# Patient Record
Sex: MALE | NOT HISPANIC OR LATINO | Employment: FULL TIME | URBAN - METROPOLITAN AREA
[De-identification: names, ages, dates, MRNs, and addresses within clinical notes are randomized per-mention and may not be internally consistent; named-entity substitution may affect disease eponyms.]

---

## 2021-02-12 ENCOUNTER — OFFICE VISIT (OUTPATIENT)
Dept: ENDOCRINOLOGY | Facility: CLINIC | Age: 56
End: 2021-02-12

## 2021-02-12 ENCOUNTER — LAB (OUTPATIENT)
Dept: LAB | Facility: HOSPITAL | Age: 56
End: 2021-02-12

## 2021-02-12 VITALS
TEMPERATURE: 97.8 F | DIASTOLIC BLOOD PRESSURE: 60 MMHG | HEIGHT: 67 IN | WEIGHT: 159 LBS | OXYGEN SATURATION: 98 % | SYSTOLIC BLOOD PRESSURE: 124 MMHG | HEART RATE: 88 BPM | BODY MASS INDEX: 24.96 KG/M2

## 2021-02-12 DIAGNOSIS — C73 PAPILLARY THYROID CARCINOMA (HCC): Primary | ICD-10-CM

## 2021-02-12 DIAGNOSIS — E89.0 POSTOPERATIVE HYPOTHYROIDISM: ICD-10-CM

## 2021-02-12 LAB
T4 FREE SERPL-MCNC: 1.5 NG/DL (ref 0.93–1.7)
TSH SERPL DL<=0.05 MIU/L-ACNC: 2.32 UIU/ML (ref 0.27–4.2)

## 2021-02-12 PROCEDURE — 99204 OFFICE O/P NEW MOD 45 MIN: CPT | Performed by: INTERNAL MEDICINE

## 2021-02-12 PROCEDURE — 84439 ASSAY OF FREE THYROXINE: CPT

## 2021-02-12 PROCEDURE — 84443 ASSAY THYROID STIM HORMONE: CPT

## 2021-02-12 RX ORDER — LEVOTHYROXINE SODIUM 88 UG/1
TABLET ORAL DAILY
COMMUNITY
Start: 2020-11-20 | End: 2021-02-12 | Stop reason: SDUPTHER

## 2021-02-12 RX ORDER — LEVOTHYROXINE SODIUM 88 UG/1
88 TABLET ORAL DAILY
Qty: 90 TABLET | Refills: 3 | Status: SHIPPED | OUTPATIENT
Start: 2021-02-12 | End: 2022-02-10

## 2021-02-12 NOTE — PROGRESS NOTES
"     Office Note      Date: 2021  Patient Name: Dangelo Jha  MRN: [id number]  : 1965    Chief Complaint   Patient presents with   • Thyroid Problem       History of Present Illness:   Dangelo Jha is a 55 y.o. male who presents for Thyroid Problem    He remains on T4 88mcg qd. He is taking this correctly. He isn't taking any interfering meds concurrently. He hasn't noted any change in the size of his neck. He denies any compressive sxs. He denies any sxs of hypo- or hyperthyroidism at this time.    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 residual thyroid tissue in right thyroid bed - TG 1.0; neck u/s  stable with unstimulted TG of 1.1; neck u/s 2018 was stable and unstimulated TG was 1.1; neck u/s 2019 with stable neck mass and TG 3.0; unstimulated TG 1.1 2020    Subjective      Review of Systems:   Review of Systems   Constitutional: Negative.    Cardiovascular: Negative.    Gastrointestinal: Negative.    Endocrine: Negative.        The following portions of the patient's history were reviewed and updated as appropriate: allergies, current medications, past family history, past medical history, past social history, past surgical history and problem list.    Objective     Visit Vitals  /60 (BP Location: Left arm, Patient Position: Sitting, Cuff Size: Adult)   Pulse 88   Temp 97.8 °F (36.6 °C) (Infrared)   Ht 170.2 cm (67\")   Wt 72.1 kg (159 lb)   SpO2 98%   BMI 24.90 kg/m²       Physical Exam:  Physical Exam  Constitutional:       Appearance: Normal appearance.   Neck:      Comments: No palpable thyroid tissue or neck masses  Lymphadenopathy:      Cervical: No cervical adenopathy.   Neurological:      Mental Status: He is alert.         Labs:    TSH  No results found for: TSHBASE     Free T4  No results found for: FREET4    T3  No results found for: N8ZYHFM      TPO  No " results found for: THYROIDAB    TG AB  No results found for: THGAB    TG  No results found for: THYROGLB    CBC w/DIFF  No results found for: WBC, RBC, HGB, HCT, MCV, MCH, MCHC, RDW, RDWSD, MPV, PLT, NEUTRORELPCT, LYMPHORELPCT, MONORELPCT, EOSRELPCT, BASORELPCT, AUTOIGPER, NEUTROABS, LYMPHSABS, MONOSABS, EOSABS, BASOSABS, AUTOIGNUM, NRBC        Assessment / Plan      Assessment & Plan:  Diagnoses and all orders for this visit:    1. Papillary thyroid carcinoma (CMS/HCC) (Primary)  Assessment & Plan:  Plan for neck u/s and TG in 6 months.      2. Postoperative hypothyroidism  Assessment & Plan:  Check TFTs today.    Orders:  -     TSH; Future  -     T4, Free; Future    Other orders  -     levothyroxine (SYNTHROID, LEVOTHROID) 88 MCG tablet; Take 1 tablet by mouth Daily.  Dispense: 90 tablet; Refill: 3      Return in about 6 months (around 8/12/2021) for Recheck with TSH, free T4, TG, TG ab and neck u/s.    Bob Flynn MD   02/12/2021

## 2021-08-13 ENCOUNTER — OFFICE VISIT (OUTPATIENT)
Dept: ENDOCRINOLOGY | Facility: CLINIC | Age: 56
End: 2021-08-13

## 2021-08-13 ENCOUNTER — LAB (OUTPATIENT)
Dept: LAB | Facility: HOSPITAL | Age: 56
End: 2021-08-13

## 2021-08-13 VITALS
WEIGHT: 165 LBS | SYSTOLIC BLOOD PRESSURE: 116 MMHG | DIASTOLIC BLOOD PRESSURE: 64 MMHG | BODY MASS INDEX: 25.9 KG/M2 | HEART RATE: 68 BPM | OXYGEN SATURATION: 97 % | HEIGHT: 67 IN

## 2021-08-13 DIAGNOSIS — C73 PAPILLARY THYROID CARCINOMA (HCC): Primary | ICD-10-CM

## 2021-08-13 DIAGNOSIS — C73 PAPILLARY THYROID CARCINOMA (HCC): ICD-10-CM

## 2021-08-13 DIAGNOSIS — E89.0 POSTOPERATIVE HYPOTHYROIDISM: ICD-10-CM

## 2021-08-13 LAB
T4 FREE SERPL-MCNC: 1.59 NG/DL (ref 0.93–1.7)
TSH SERPL DL<=0.05 MIU/L-ACNC: 1.11 UIU/ML (ref 0.27–4.2)

## 2021-08-13 PROCEDURE — 99213 OFFICE O/P EST LOW 20 MIN: CPT | Performed by: INTERNAL MEDICINE

## 2021-08-13 PROCEDURE — 84432 ASSAY OF THYROGLOBULIN: CPT

## 2021-08-13 PROCEDURE — 84443 ASSAY THYROID STIM HORMONE: CPT

## 2021-08-13 PROCEDURE — 76536 US EXAM OF HEAD AND NECK: CPT | Performed by: INTERNAL MEDICINE

## 2021-08-13 PROCEDURE — 86800 THYROGLOBULIN ANTIBODY: CPT

## 2021-08-13 PROCEDURE — 84439 ASSAY OF FREE THYROXINE: CPT

## 2021-08-13 NOTE — ASSESSMENT & PLAN NOTE
Check TG and TG ab.    A neck u/s was performed today.  This revealed residual thyroid tissue in the right thyroid bed that was 1cm in size.  No abnormal lymph nodes were seen.

## 2021-08-13 NOTE — PROGRESS NOTES
"     Office Note      Date: 2021  Patient Name: Dangelo Jha  MRN: [id number]  : 1965    Chief Complaint   Patient presents with   • Hypothyroidism       History of Present Illness:   Dangelo Jha is a 56 y.o. male who presents for Hypothyroidism    He remains on T4 88mcg qd. He is taking this correctly. He isn't taking any interfering meds concurrently. He hasn't noted any change in the size of his neck. He denies any compressive sxs. He denies any sxs of hypo- or hyperthyroidism at this time.     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 residual thyroid tissue in right thyroid bed - TG 1.0; neck u/s  stable with unstimulted TG of 1.1; neck u/s 2018 was stable and unstimulated TG was 1.1; neck u/s 2019 with stable neck mass and TG 3.0; unstimulated TG 1.1 2020; unstimulated TG 1.5 and neck u/s showed 1cm residual thyroid tissue in right thyroid bed 2021.    Subjective      Review of Systems:   Review of Systems   Constitutional: Negative.    Cardiovascular: Negative.    Gastrointestinal: Negative.    Endocrine: Negative.        The following portions of the patient's history were reviewed and updated as appropriate: allergies, current medications, past family history, past medical history, past social history, past surgical history and problem list.    Objective     Visit Vitals  /64   Pulse 68   Ht 170.2 cm (67\")   Wt 74.8 kg (165 lb)   SpO2 97%   BMI 25.84 kg/m²       Physical Exam:  Physical Exam  Constitutional:       Appearance: Normal appearance.   Neurological:      Mental Status: He is alert.         Labs:    TSH  No results found for: TSHBASE     Free T4  Free T4   Date Value Ref Range Status   2021 1.59 0.93 - 1.70 ng/dL Final       T3  No results found for: C8AJIIO      TPO  No results found for: THYROIDAB    TG AB  No results found for: THGAB    TG  No " results found for: THYROGLB    CBC w/DIFF  No results found for: WBC, RBC, HGB, HCT, MCV, MCH, MCHC, RDW, RDWSD, MPV, PLT, NEUTRORELPCT, LYMPHORELPCT, MONORELPCT, EOSRELPCT, BASORELPCT, AUTOIGPER, NEUTROABS, LYMPHSABS, MONOSABS, EOSABS, BASOSABS, AUTOIGNUM, NRBC        Assessment / Plan      Assessment & Plan:  Diagnoses and all orders for this visit:    1. Papillary thyroid carcinoma (CMS/HCC) (Primary)  Assessment & Plan:  Check TG and TG ab.    A neck u/s was performed today.  This revealed residual thyroid tissue in the right thyroid bed that was 1cm in size.  No abnormal lymph nodes were seen.    Orders:  -     Thyroglobulin + Thyroglobulin Antibody ([place]); Future  -     US Thyroid    2. Postoperative hypothyroidism  Assessment & Plan:  Continue T4.  Check TSH today.    Orders:  -     TSH; Future  -     T4, Free; Future      Return in about 6 months (around 2/13/2022) for Recheck with TSH, free T4.    Bob Flynn MD   08/13/2021

## 2021-08-18 LAB — REF LAB TEST METHOD: NORMAL

## 2022-02-10 RX ORDER — LEVOTHYROXINE SODIUM 88 UG/1
88 TABLET ORAL DAILY
Qty: 90 TABLET | Refills: 0 | Status: SHIPPED | OUTPATIENT
Start: 2022-02-10 | End: 2022-02-18 | Stop reason: SDUPTHER

## 2022-02-18 ENCOUNTER — OFFICE VISIT (OUTPATIENT)
Dept: ENDOCRINOLOGY | Facility: CLINIC | Age: 57
End: 2022-02-18

## 2022-02-18 ENCOUNTER — LAB (OUTPATIENT)
Dept: LAB | Facility: HOSPITAL | Age: 57
End: 2022-02-18

## 2022-02-18 VITALS
HEIGHT: 67 IN | BODY MASS INDEX: 27.78 KG/M2 | HEART RATE: 63 BPM | DIASTOLIC BLOOD PRESSURE: 68 MMHG | OXYGEN SATURATION: 98 % | WEIGHT: 177 LBS | SYSTOLIC BLOOD PRESSURE: 114 MMHG

## 2022-02-18 DIAGNOSIS — E89.0 POSTOPERATIVE HYPOTHYROIDISM: ICD-10-CM

## 2022-02-18 DIAGNOSIS — E89.0 POSTOPERATIVE HYPOTHYROIDISM: Primary | ICD-10-CM

## 2022-02-18 DIAGNOSIS — C73 PAPILLARY THYROID CARCINOMA: ICD-10-CM

## 2022-02-18 PROCEDURE — 84439 ASSAY OF FREE THYROXINE: CPT

## 2022-02-18 PROCEDURE — 99213 OFFICE O/P EST LOW 20 MIN: CPT | Performed by: INTERNAL MEDICINE

## 2022-02-18 PROCEDURE — 84443 ASSAY THYROID STIM HORMONE: CPT

## 2022-02-18 RX ORDER — LEVOTHYROXINE SODIUM 88 UG/1
88 TABLET ORAL DAILY
Qty: 90 TABLET | Refills: 3 | Status: SHIPPED | OUTPATIENT
Start: 2022-02-18 | End: 2022-05-20

## 2022-02-18 NOTE — PROGRESS NOTES
"     Office Note      Date: 2022  Patient Name: Dangelo Jha  MRN: [id number]  : 1965    Chief Complaint   Patient presents with   • Thyroid Problem       History of Present Illness:   Dangelo Jha is a 56 y.o. male who presents for Thyroid Problem    He remains on T4 88mcg qd. He is taking this correctly. He isn't taking any interfering meds concurrently. He hasn't noted any change in the size of his neck. He denies any compressive sxs. He denies any sxs of hypo- or hyperthyroidism at this time.     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 residual thyroid tissue in right thyroid bed - TG 1.0; neck u/s  stable with unstimulted TG of 1.1; neck u/s 2018 was stable and unstimulated TG was 1.1; neck u/s 2019 with stable neck mass and TG 3.0; unstimulated TG 1.1 2020; unstimulated TG 1.5 and neck u/s showed 1cm residual thyroid tissue in right thyroid bed 2021.    Subjective      Review of Systems:   Review of Systems   Constitutional: Negative.    Cardiovascular: Negative.    Gastrointestinal: Negative.    Endocrine: Negative.        The following portions of the patient's history were reviewed and updated as appropriate: allergies, current medications, past family history, past medical history, past social history, past surgical history and problem list.    Objective     Visit Vitals  /68   Pulse 63   Ht 170.2 cm (67\")   Wt 80.3 kg (177 lb)   SpO2 98%   BMI 27.72 kg/m²       Physical Exam:  Physical Exam  Constitutional:       Appearance: Normal appearance.   Neck:      Comments: No palpable thyroid tissue or neck masses.  Lymphadenopathy:      Cervical: No cervical adenopathy.   Neurological:      Mental Status: He is alert.         Labs:    TSH  No results found for: TSHBASE     Free T4  Free T4   Date Value Ref Range Status   2021 1.59 0.93 - 1.70 ng/dL Final "       T3  No results found for: [id number]      TPO  No results found for: THYROIDAB    TG AB  No results found for: THGAB    TG  No results found for: THYROGLB    CBC w/DIFF  No results found for: WBC, RBC, HGB, HCT, MCV, MCH, MCHC, RDW, RDWSD, MPV, PLT, NEUTRORELPCT, LYMPHORELPCT, MONORELPCT, EOSRELPCT, BASORELPCT, AUTOIGPER, NEUTROABS, LYMPHSABS, MONOSABS, EOSABS, BASOSABS, AUTOIGNUM, NRBC        Assessment / Plan      Assessment & Plan:  Diagnoses and all orders for this visit:    1. Postoperative hypothyroidism (Primary)  Assessment & Plan:  Continue T4.  Check TFTs today.    Orders:  -     TSH; Future  -     T4, Free; Future    2. Papillary thyroid carcinoma (HCC)  Assessment & Plan:  Plan for TG next visit and neck u/s in about 18 months.      Other orders  -     levothyroxine (SYNTHROID, LEVOTHROID) 88 MCG tablet; Take 1 tablet by mouth Daily.  Dispense: 90 tablet; Refill: 3      Return in about 6 months (around 8/18/2022) for Recheck with TSH, free T4, TG, TG ab.    Bob Flynn MD   02/18/2022

## 2022-02-19 LAB
T4 FREE SERPL-MCNC: 1.44 NG/DL (ref 0.93–1.7)
TSH SERPL DL<=0.05 MIU/L-ACNC: 2.46 UIU/ML (ref 0.27–4.2)

## 2022-05-20 RX ORDER — LEVOTHYROXINE SODIUM 88 UG/1
88 TABLET ORAL DAILY
Qty: 90 TABLET | Refills: 3 | Status: SHIPPED | OUTPATIENT
Start: 2022-05-20 | End: 2023-03-22 | Stop reason: SDUPTHER

## 2022-09-21 ENCOUNTER — OFFICE VISIT (OUTPATIENT)
Dept: ENDOCRINOLOGY | Facility: CLINIC | Age: 57
End: 2022-09-21

## 2022-09-21 ENCOUNTER — LAB (OUTPATIENT)
Dept: LAB | Facility: HOSPITAL | Age: 57
End: 2022-09-21

## 2022-09-21 VITALS
SYSTOLIC BLOOD PRESSURE: 110 MMHG | DIASTOLIC BLOOD PRESSURE: 62 MMHG | OXYGEN SATURATION: 97 % | BODY MASS INDEX: 26.53 KG/M2 | HEART RATE: 94 BPM | WEIGHT: 169 LBS | HEIGHT: 67 IN

## 2022-09-21 DIAGNOSIS — C73 PAPILLARY THYROID CARCINOMA: ICD-10-CM

## 2022-09-21 DIAGNOSIS — E89.0 POSTOPERATIVE HYPOTHYROIDISM: Primary | ICD-10-CM

## 2022-09-21 LAB
T4 FREE SERPL-MCNC: 1.42 NG/DL (ref 0.93–1.7)
TSH SERPL DL<=0.05 MIU/L-ACNC: 1.09 UIU/ML (ref 0.27–4.2)

## 2022-09-21 PROCEDURE — 84443 ASSAY THYROID STIM HORMONE: CPT | Performed by: INTERNAL MEDICINE

## 2022-09-21 PROCEDURE — 84439 ASSAY OF FREE THYROXINE: CPT | Performed by: INTERNAL MEDICINE

## 2022-09-21 PROCEDURE — 84432 ASSAY OF THYROGLOBULIN: CPT | Performed by: INTERNAL MEDICINE

## 2022-09-21 PROCEDURE — 99213 OFFICE O/P EST LOW 20 MIN: CPT | Performed by: INTERNAL MEDICINE

## 2022-09-21 PROCEDURE — 86800 THYROGLOBULIN ANTIBODY: CPT | Performed by: INTERNAL MEDICINE

## 2022-09-21 NOTE — PROGRESS NOTES
"     Office Note      Date: 2022  Patient Name: Dangelo Jha  MRN: [id number]  : 1965    Chief Complaint   Patient presents with   • Hypothyroidism       History of Present Illness:   Dangelo Jha is a 57 y.o. male who presents for Hypothyroidism    He remains on T4 88mcg qd. He is taking this correctly. He isn't taking any interfering meds concurrently. He hasn't noted any change in the size of his neck. He denies any compressive sxs. He denies any sxs of hypo- or hyperthyroidism at this time.     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 residual thyroid tissue in right thyroid bed - TG 1.0; neck u/s  stable with unstimulted TG of 1.1; neck u/s 2018 was stable and unstimulated TG was 1.1; neck u/s 2019 with stable neck mass and TG 3.0; unstimulated TG 1.1 2020; unstimulated TG 1.5 and neck u/s showed 1cm residual thyroid tissue in right thyroid bed 2021.    Subjective      Review of Systems:   Review of Systems   Constitutional: Negative.    Cardiovascular: Negative.    Gastrointestinal: Negative.    Endocrine: Negative.        The following portions of the patient's history were reviewed and updated as appropriate: allergies, current medications, past family history, past medical history, past social history, past surgical history and problem list.    Objective     Visit Vitals  /62   Pulse 94   Ht 170.2 cm (67\")   Wt 76.7 kg (169 lb)   SpO2 97%   BMI 26.47 kg/m²       Physical Exam:  Physical Exam  Constitutional:       Appearance: Normal appearance.   Neck:      Comments: No palpable thyroid tissue or neck masses  Lymphadenopathy:      Cervical: No cervical adenopathy.   Neurological:      Mental Status: He is alert.         Labs:    TSH  No results found for: TSHBASE     Free T4  Free T4   Date Value Ref Range Status   2022 1.44 0.93 - 1.70 ng/dL Final       T3  No " results found for: [id number]      TPO  No results found for: THYROIDAB    TG AB  Thyroglobulin Ab   Date Value Ref Range Status   08/13/2021 <1.0 <4.0 IU/mL Final       TG  Thyroglobulin   Date Value Ref Range Status   08/13/2021 1.5 <=34.0 ng/mL Final       CBC w/DIFF  No results found for: WBC, RBC, HGB, HCT, MCV, MCH, MCHC, RDW, RDWSD, MPV, PLT, NEUTRORELPCT, LYMPHORELPCT, MONORELPCT, EOSRELPCT, BASORELPCT, AUTOIGPER, NEUTROABS, LYMPHSABS, MONOSABS, EOSABS, BASOSABS, AUTOIGNUM, NRBC        Assessment / Plan      Assessment & Plan:  Diagnoses and all orders for this visit:    1. Postoperative hypothyroidism (Primary)  Assessment & Plan:  Continue T4 tx.  Check TFTs.      Orders:  -     TSH; Future  -     T4, Free; Future    2. Papillary thyroid carcinoma (HCC)  Assessment & Plan:  Check TG and TG ab today.  Will send note about results.  Plan for another neck u/s in about a year.    Orders:  -     Thyroglobulin + Thyroglobulin Antibody ([place]); Future      Return in about 6 months (around 3/21/2023) for Recheck with TSH, free T4.    Bob Flynn MD   09/21/2022

## 2022-09-21 NOTE — ASSESSMENT & PLAN NOTE
Check TG and TG ab today.  Will send note about results.  Plan for another neck u/s in about a year.

## 2022-09-23 LAB — REF LAB TEST METHOD: NORMAL

## 2023-03-22 ENCOUNTER — OFFICE VISIT (OUTPATIENT)
Dept: ENDOCRINOLOGY | Facility: CLINIC | Age: 58
End: 2023-03-22
Payer: COMMERCIAL

## 2023-03-22 VITALS
DIASTOLIC BLOOD PRESSURE: 62 MMHG | OXYGEN SATURATION: 99 % | HEART RATE: 57 BPM | HEIGHT: 67 IN | BODY MASS INDEX: 27.47 KG/M2 | WEIGHT: 175 LBS | SYSTOLIC BLOOD PRESSURE: 124 MMHG

## 2023-03-22 DIAGNOSIS — C73 PAPILLARY THYROID CARCINOMA: ICD-10-CM

## 2023-03-22 DIAGNOSIS — E89.0 POSTOPERATIVE HYPOTHYROIDISM: Primary | ICD-10-CM

## 2023-03-22 LAB
T4 FREE SERPL-MCNC: 1.46 NG/DL (ref 0.93–1.7)
TSH SERPL DL<=0.05 MIU/L-ACNC: 1.92 UIU/ML (ref 0.27–4.2)

## 2023-03-22 PROCEDURE — 84443 ASSAY THYROID STIM HORMONE: CPT | Performed by: INTERNAL MEDICINE

## 2023-03-22 PROCEDURE — 84439 ASSAY OF FREE THYROXINE: CPT | Performed by: INTERNAL MEDICINE

## 2023-03-22 PROCEDURE — 99213 OFFICE O/P EST LOW 20 MIN: CPT | Performed by: INTERNAL MEDICINE

## 2023-03-22 RX ORDER — LEVOTHYROXINE SODIUM 88 UG/1
88 TABLET ORAL DAILY
Qty: 90 TABLET | Refills: 3 | Status: SHIPPED | OUTPATIENT
Start: 2023-03-22

## 2023-03-22 NOTE — PROGRESS NOTES
"     Office Note      Date: 2023  Patient Name: Dangleo Jha  MRN: [id number]  : 1965    Chief Complaint   Patient presents with   • Hypothyroidism       History of Present Illness:   Dangelo Jha is a 57 y.o. male who presents for Hypothyroidism    He remains on T4 88mcg qd. He is taking this correctly. He isn't taking any interfering meds concurrently. He hasn't noted any change in the size of his neck. He denies any compressive sxs. He denies any sxs of hypo- or hyperthyroidism at this time.     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 residual thyroid tissue in right thyroid bed - TG 1.0; neck u/s  stable with unstimulted TG of 1.1; neck u/s 2018 was stable and unstimulated TG was 1.1; neck u/s 2019 with stable neck mass and TG 3.0; unstimulated TG 1.1 2020; unstimulated TG 1.5 and neck u/s showed 1cm residual thyroid tissue in right thyroid bed 2021, unstimulated TG 1.7 in 2022.    Subjective      Review of Systems:   Review of Systems   Constitutional: Negative.    Cardiovascular: Negative.    Gastrointestinal: Negative.    Endocrine: Negative.        The following portions of the patient's history were reviewed and updated as appropriate: allergies, current medications, past family history, past medical history, past social history, past surgical history and problem list.    Objective     Visit Vitals  /62 (BP Location: Left arm, Patient Position: Sitting, Cuff Size: Adult)   Pulse 57   Ht 170.2 cm (67\")   Wt 79.4 kg (175 lb)   SpO2 99%   BMI 27.41 kg/m²       Physical Exam:  Physical Exam  Constitutional:       Appearance: Normal appearance.   Neck:      Comments: No palpable thyroid tissue or neck masses  Lymphadenopathy:      Cervical: No cervical adenopathy.   Neurological:      Mental Status: He is alert.         Labs:    TSH  No results found for: TSHBASE "     Free T4  Free T4   Date Value Ref Range Status   09/21/2022 1.42 0.93 - 1.70 ng/dL Final       T3  No results found for: [id number]      TPO  No results found for: THYROIDAB    TG AB  Thyroglobulin Ab   Date Value Ref Range Status   09/21/2022 <1.0 <4.0 IU/mL Final       TG  Thyroglobulin   Date Value Ref Range Status   09/21/2022 1.7 <=34.0 ng/mL Final       CBC w/DIFF  No results found for: WBC, RBC, HGB, HCT, MCV, MCH, MCHC, RDW, RDWSD, MPV, PLT, NEUTRORELPCT, LYMPHORELPCT, MONORELPCT, EOSRELPCT, BASORELPCT, AUTOIGPER, NEUTROABS, LYMPHSABS, MONOSABS, EOSABS, BASOSABS, AUTOIGNUM, NRBC        Assessment / Plan      Assessment & Plan:  Diagnoses and all orders for this visit:    1. Postoperative hypothyroidism (Primary)  Assessment & Plan:  Continue T4 tx.  Check TFTs today.  Will send note about results.    Orders:  -     TSH; Future  -     T4, Free; Future    2. Papillary thyroid carcinoma (HCC)  Assessment & Plan:  Plan for TG and neck u/s in 6 months.      Other orders  -     levothyroxine (SYNTHROID, LEVOTHROID) 88 MCG tablet; Take 1 tablet by mouth Daily.  Dispense: 90 tablet; Refill: 3    Current Outpatient Medications   Medication Instructions   • levothyroxine (SYNTHROID, LEVOTHROID) 88 mcg, Oral, Daily      Return in about 6 months (around 9/22/2023) for Recheck with TSH, free T4, TG, TG ab, neck u/s.    Bob Flynn MD   03/22/2023

## 2023-09-25 ENCOUNTER — OFFICE VISIT (OUTPATIENT)
Dept: ENDOCRINOLOGY | Facility: CLINIC | Age: 58
End: 2023-09-25

## 2023-09-25 VITALS
WEIGHT: 174 LBS | HEART RATE: 60 BPM | DIASTOLIC BLOOD PRESSURE: 60 MMHG | HEIGHT: 67 IN | BODY MASS INDEX: 27.31 KG/M2 | SYSTOLIC BLOOD PRESSURE: 120 MMHG | OXYGEN SATURATION: 100 %

## 2023-09-25 DIAGNOSIS — E89.0 POSTOPERATIVE HYPOTHYROIDISM: Primary | ICD-10-CM

## 2023-09-25 DIAGNOSIS — C73 PAPILLARY THYROID CARCINOMA: ICD-10-CM

## 2023-09-25 LAB
T4 FREE SERPL-MCNC: 1.65 NG/DL (ref 0.93–1.7)
TSH SERPL DL<=0.05 MIU/L-ACNC: 1.52 UIU/ML (ref 0.27–4.2)

## 2023-09-25 PROCEDURE — 76536 US EXAM OF HEAD AND NECK: CPT | Performed by: INTERNAL MEDICINE

## 2023-09-25 PROCEDURE — 99213 OFFICE O/P EST LOW 20 MIN: CPT | Performed by: INTERNAL MEDICINE

## 2023-09-25 PROCEDURE — 84432 ASSAY OF THYROGLOBULIN: CPT | Performed by: INTERNAL MEDICINE

## 2023-09-25 PROCEDURE — 84439 ASSAY OF FREE THYROXINE: CPT | Performed by: INTERNAL MEDICINE

## 2023-09-25 PROCEDURE — 86800 THYROGLOBULIN ANTIBODY: CPT | Performed by: INTERNAL MEDICINE

## 2023-09-25 PROCEDURE — 84443 ASSAY THYROID STIM HORMONE: CPT | Performed by: INTERNAL MEDICINE

## 2023-09-25 RX ORDER — BIOTIN 10 MG
TABLET ORAL
COMMUNITY

## 2023-09-25 NOTE — PROGRESS NOTES
"     Office Note      Date: 2023  Patient Name: Dangelo Jha  MRN: [id number]  : 1965    Chief Complaint   Patient presents with    Hypothyroidism       History of Present Illness:   Dangelo Jha is a 58 y.o. male who presents for Hypothyroidism    He remains on T4 88mcg qd. He is taking this correctly. He isn't taking any interfering meds concurrently. He hasn't noted any change in the size of his neck. He denies any compressive sxs. He denies any sxs of hypo- or hyperthyroidism at this time.     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 residual thyroid tissue in right thyroid bed - TG 1.0; neck u/s  stable with unstimulted TG of 1.1; neck u/s 2018 was stable and unstimulated TG was 1.1; neck u/s 2019 with stable neck mass and TG 3.0; unstimulated TG 1.1 2020; unstimulated TG 1.5 and neck u/s showed 1cm residual thyroid tissue in right thyroid bed 2021, unstimulated TG 1.7 in 2022.    Subjective      Review of Systems:   Review of Systems   Constitutional: Negative.    Cardiovascular: Negative.    Gastrointestinal: Negative.    Endocrine: Negative.      The following portions of the patient's history were reviewed and updated as appropriate: allergies, current medications, past family history, past medical history, past social history, past surgical history, and problem list.    Objective     Visit Vitals  /60   Pulse 60   Ht 170.2 cm (67\")   Wt 78.9 kg (174 lb)   SpO2 100%   BMI 27.25 kg/m²       Physical Exam:  Physical Exam  Constitutional:       Appearance: Normal appearance.   Neurological:      Mental Status: He is alert.       Labs:    TSH  No results found for: TSHBASE     Free T4  Free T4   Date Value Ref Range Status   2023 1.46 0.93 - 1.70 ng/dL Final       T3  No results found for: [id number]      TPO  No results found for: THYROIDAB    TG AB  Thyroglobulin " Ab   Date Value Ref Range Status   09/21/2022 <1.0 <4.0 IU/mL Final       TG  Thyroglobulin   Date Value Ref Range Status   09/21/2022 1.7 <=34.0 ng/mL Final       CBC w/DIFF  No results found for: WBC, RBC, HGB, HCT, MCV, MCH, MCHC, RDW, RDWSD, MPV, PLT, NEUTRORELPCT, LYMPHORELPCT, MONORELPCT, EOSRELPCT, BASORELPCT, AUTOIGPER, NEUTROABS, LYMPHSABS, MONOSABS, EOSABS, BASOSABS, AUTOIGNUM, NRBC        Assessment / Plan      Assessment & Plan:  Diagnoses and all orders for this visit:    1. Postoperative hypothyroidism (Primary)  Assessment & Plan:  Continue T4 tx.  Check TFTs today.    Orders:  -     TSH; Future  -     T4, Free; Future    2. Papillary thyroid carcinoma  Assessment & Plan:  Check TG today.  Will send note about results.    A neck u/s was performed today.  This revealed what appeared to be residual thyroid tissue in the right thyroid bed.  It measured 1cm in size which is stable compared to u/s from 8/2021.  No abnormal lymph nodes were seen.    Plan for another u/s in 2 years.    Orders:  -     Thyroglobulin Antibody and Thyroglobulin, FRANCO or LC/MS-MS; Future  -     US Thyroid      Current Outpatient Medications   Medication Instructions    levothyroxine (SYNTHROID, LEVOTHROID) 88 mcg, Oral, Daily    Multiple Vitamins-Minerals (Multivitamin Adult) chewable tablet       Return in about 6 months (around 3/25/2024) for Recheck with TSH, free T4.    Bob Flynn MD   09/25/2023

## 2023-09-25 NOTE — ASSESSMENT & PLAN NOTE
Check TG today.  Will send note about results.    A neck u/s was performed today.  This revealed what appeared to be residual thyroid tissue in the right thyroid bed.  It measured 1cm in size which is stable compared to u/s from 8/2021.  No abnormal lymph nodes were seen.    Plan for another u/s in 2 years.

## 2023-09-27 LAB
THYROGLOB AB SERPL-ACNC: <1 IU/ML (ref 0–0.9)
THYROGLOB SERPL-MCNC: 2.3 NG/ML (ref 1.4–29.2)

## 2024-03-25 ENCOUNTER — OFFICE VISIT (OUTPATIENT)
Dept: ENDOCRINOLOGY | Facility: CLINIC | Age: 59
End: 2024-03-25
Payer: COMMERCIAL

## 2024-03-25 VITALS
HEART RATE: 60 BPM | DIASTOLIC BLOOD PRESSURE: 60 MMHG | SYSTOLIC BLOOD PRESSURE: 102 MMHG | WEIGHT: 180 LBS | BODY MASS INDEX: 28.19 KG/M2 | OXYGEN SATURATION: 97 %

## 2024-03-25 DIAGNOSIS — C73 PAPILLARY THYROID CARCINOMA: ICD-10-CM

## 2024-03-25 DIAGNOSIS — E89.0 POSTOPERATIVE HYPOTHYROIDISM: Primary | ICD-10-CM

## 2024-03-25 LAB
T4 FREE SERPL-MCNC: 1.46 NG/DL (ref 0.93–1.7)
TSH SERPL DL<=0.05 MIU/L-ACNC: 1.15 UIU/ML (ref 0.27–4.2)

## 2024-03-25 PROCEDURE — 84439 ASSAY OF FREE THYROXINE: CPT | Performed by: INTERNAL MEDICINE

## 2024-03-25 PROCEDURE — 84443 ASSAY THYROID STIM HORMONE: CPT | Performed by: INTERNAL MEDICINE

## 2024-03-25 PROCEDURE — 99213 OFFICE O/P EST LOW 20 MIN: CPT | Performed by: INTERNAL MEDICINE

## 2024-03-25 RX ORDER — LEVOTHYROXINE SODIUM 88 UG/1
88 TABLET ORAL DAILY
Qty: 90 TABLET | Refills: 3 | Status: SHIPPED | OUTPATIENT
Start: 2024-03-25

## 2024-03-25 NOTE — PROGRESS NOTES
Office Note      Date: 2024  Patient Name: Dangelo Jha  MRN: [id number]  : 1965    Chief Complaint   Patient presents with    Postoperative hypothyroidism       History of Present Illness:   Dangelo Jha is a 58 y.o. male who presents for Postoperative hypothyroidism    He remains on T4 88mcg qd. He is taking this correctly. He isn't taking any interfering meds concurrently. He hasn't noted any change in the size of his neck. He denies any compressive sxs. He denies any sxs of hypo- or hyperthyroidism at this time.     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 residual thyroid tissue in right thyroid bed - TG 1.0; neck u/s  stable with unstimulted TG of 1.1; neck u/s 2018 was stable and unstimulated TG was 1.1; neck u/s 2019 with stable neck mass and TG 3.0; unstimulated TG 1.1 2020; unstimulated TG 1.5 and neck u/s showed 1cm residual thyroid tissue in right thyroid bed 2021, unstimulated TG 1.7 in 2022; unstimulated TG of 2.3 with stable neck u/s 2023    Subjective      Review of Systems:   Review of Systems   Constitutional: Negative.    Cardiovascular: Negative.    Gastrointestinal: Negative.    Endocrine: Negative.        The following portions of the patient's history were reviewed and updated as appropriate: allergies, current medications, past family history, past medical history, past social history, past surgical history, and problem list.    Objective     Visit Vitals  /60 (BP Location: Left arm)   Pulse 60   Wt 81.6 kg (180 lb)   SpO2 97%   BMI 28.19 kg/m²       Physical Exam:  Physical Exam  Constitutional:       Appearance: Normal appearance.   Neck:      Comments: No palpable thyroid tissue or neck masses  Lymphadenopathy:      Cervical: No cervical adenopathy.   Neurological:      Mental Status: He is alert.         Labs:    TSH  No results found for:  "\"TSHBASE\"     Free T4  Free T4   Date Value Ref Range Status   09/25/2023 1.65 0.93 - 1.70 ng/dL Final       T3  No results found for: \"O2RXLQK\"      TPO  No results found for: \"THYROIDAB\"    TG AB  Thyroglobulin Ab   Date Value Ref Range Status   09/25/2023 <1.0 0.0 - 0.9 IU/mL Final     Comment:     Thyroglobulin Antibody measured by [facility] [employer]   09/21/2022 <1.0 <4.0 IU/mL Final       TG  Thyroglobulin   Date Value Ref Range Status   09/21/2022 1.7 <=34.0 ng/mL Final       CBC w/DIFF  No results found for: \"WBC\", \"RBC\", \"HGB\", \"HCT\", \"MCV\", \"MCH\", \"MCHC\", \"RDW\", \"RDWSD\", \"MPV\", \"PLT\", \"NEUTRORELPCT\", \"LYMPHORELPCT\", \"MONORELPCT\", \"EOSRELPCT\", \"BASORELPCT\", \"AUTOIGPER\", \"NEUTROABS\", \"LYMPHSABS\", \"MONOSABS\", \"EOSABS\", \"BASOSABS\", \"AUTOIGNUM\", \"NRBC\"        Assessment / Plan      Assessment & Plan:  Diagnoses and all orders for this visit:    1. Postoperative hypothyroidism (Primary)  Assessment & Plan:  Continue T4 tx.  Check TFTs.    Orders:  -     TSH; Future  -     T4, Free; Future    2. Papillary thyroid carcinoma  Assessment & Plan:  Plan for TG in 6 months.  Plan for neck u/s in 18 months.      Other orders  -     levothyroxine (SYNTHROID, LEVOTHROID) 88 MCG tablet; Take 1 tablet by mouth Daily.  Dispense: 90 tablet; Refill: 3      Current Outpatient Medications   Medication Instructions    levothyroxine (SYNTHROID, LEVOTHROID) 88 mcg, Oral, Daily    Multiple Vitamins-Minerals (Multivitamin Adult) chewable tablet       Return in about 6 months (around 9/25/2024) for Recheck with TSH, free T4, TG, TG ab.    Electronically signed by: Bob Flynn MD  03/25/2024  "

## 2024-10-09 ENCOUNTER — OFFICE VISIT (OUTPATIENT)
Dept: ENDOCRINOLOGY | Facility: CLINIC | Age: 59
End: 2024-10-09
Payer: COMMERCIAL

## 2024-10-09 VITALS
DIASTOLIC BLOOD PRESSURE: 64 MMHG | OXYGEN SATURATION: 96 % | WEIGHT: 175 LBS | BODY MASS INDEX: 27.41 KG/M2 | SYSTOLIC BLOOD PRESSURE: 108 MMHG | HEART RATE: 107 BPM

## 2024-10-09 DIAGNOSIS — C73 PAPILLARY THYROID CARCINOMA: ICD-10-CM

## 2024-10-09 DIAGNOSIS — E89.0 POSTOPERATIVE HYPOTHYROIDISM: Primary | ICD-10-CM

## 2024-10-09 LAB
T4 FREE SERPL-MCNC: 1.54 NG/DL (ref 0.92–1.68)
TSH SERPL DL<=0.05 MIU/L-ACNC: 2.22 UIU/ML (ref 0.27–4.2)

## 2024-10-09 PROCEDURE — 84439 ASSAY OF FREE THYROXINE: CPT | Performed by: INTERNAL MEDICINE

## 2024-10-09 PROCEDURE — 84443 ASSAY THYROID STIM HORMONE: CPT | Performed by: INTERNAL MEDICINE

## 2024-10-09 PROCEDURE — 86800 THYROGLOBULIN ANTIBODY: CPT | Performed by: INTERNAL MEDICINE

## 2024-10-09 PROCEDURE — 84432 ASSAY OF THYROGLOBULIN: CPT | Performed by: INTERNAL MEDICINE

## 2024-10-09 NOTE — PROGRESS NOTES
"     Office Note      Date: 10/09/2024  Patient Name: Dangelo Jha  MRN: [id number]  : 1965    Chief Complaint   Patient presents with    Hypothyroidism       History of Present Illness:   Dangelo Jha is a 59 y.o. male who presents for Hypothyroidism    He remains on T4 88mcg qd. He is taking this correctly. He isn't taking any interfering meds concurrently. He hasn't noted any change in the size of his neck. He denies any compressive sxs. He denies any sxs of hypo- or hyperthyroidism at this time.     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 residual thyroid tissue in right thyroid bed - TG 1.0; neck u/s  stable with unstimulted TG of 1.1; neck u/s 2018 was stable and unstimulated TG was 1.1; neck u/s 2019 with stable neck mass and TG 3.0; unstimulated TG 1.1 2020; unstimulated TG 1.5 and neck u/s showed 1cm residual thyroid tissue in right thyroid bed 2021, unstimulated TG 1.7 in 2022; unstimulated TG of 2.3 with stable neck u/s 2023    Subjective      Review of Systems:   Review of Systems   Constitutional: Negative.    Cardiovascular: Negative.    Gastrointestinal: Negative.    Endocrine: Negative.        The following portions of the patient's history were reviewed and updated as appropriate: allergies, current medications, past family history, past medical history, past social history, past surgical history, and problem list.    Objective     Visit Vitals  /64   Pulse 107   Wt 79.4 kg (175 lb)   SpO2 96%   BMI 27.41 kg/m²       Physical Exam:  Physical Exam  Constitutional:       Appearance: Normal appearance.   Neck:      Comments: No palpable thyroid tissue or neck masses  Lymphadenopathy:      Cervical: No cervical adenopathy.   Neurological:      Mental Status: He is alert.         Labs:    TSH  No results found for: \"TSHBASE\"     Free T4  Free T4   Date Value Ref " "Range Status   03/25/2024 1.46 0.93 - 1.70 ng/dL Final       T3  No results found for: \"B1GTBVR\"      TPO  No results found for: \"THYROIDAB\"    TG AB  Thyroglobulin Ab   Date Value Ref Range Status   09/25/2023 <1.0 0.0 - 0.9 IU/mL Final     Comment:     Thyroglobulin Antibody measured by [facility]   09/21/2022 <1.0 <4.0 IU/mL Final       TG  Thyroglobulin   Date Value Ref Range Status   09/21/2022 1.7 <=34.0 ng/mL Final       CBC w/DIFF  No results found for: \"WBC\", \"RBC\", \"HGB\", \"HCT\", \"MCV\", \"MCH\", \"MCHC\", \"RDW\", \"RDWSD\", \"MPV\", \"PLT\", \"NEUTRORELPCT\", \"LYMPHORELPCT\", \"MONORELPCT\", \"EOSRELPCT\", \"BASORELPCT\", \"AUTOIGPER\", \"NEUTROABS\", \"LYMPHSABS\", \"MONOSABS\", \"EOSABS\", \"BASOSABS\", \"AUTOIGNUM\", \"NRBC\"        Assessment / Plan      Assessment & Plan:  Diagnoses and all orders for this visit:    1. Postoperative hypothyroidism (Primary)  Assessment & Plan:  Continue T4 tx.  Check TFTs today.    Orders:  -     TSH; Future  -     T4, Free; Future    2. Papillary thyroid carcinoma  Assessment & Plan:  Check TG today.  Plan for neck u/s in a year.    Orders:  -     Thyroglobulin Antibody and Thyroglobulin, FRANCO or LC/MS-MS; Future      Current Outpatient Medications   Medication Instructions    levothyroxine (SYNTHROID, LEVOTHROID) 88 mcg, Oral, Daily    Multiple Vitamins-Minerals (Multivitamin Adult) chewable tablet       Return in about 6 months (around 4/9/2025) for Recheck with TSH, free T4.    Electronically signed by: Bob Flynn MD  10/09/2024  "

## 2024-10-10 LAB
THYROGLOB AB SERPL-ACNC: <1 IU/ML (ref 0–0.9)
THYROGLOB SERPL-MCNC: 3 NG/ML (ref 1.4–29.2)

## 2025-04-22 ENCOUNTER — OFFICE VISIT (OUTPATIENT)
Dept: ENDOCRINOLOGY | Facility: CLINIC | Age: 60
End: 2025-04-22
Payer: COMMERCIAL

## 2025-04-22 VITALS
WEIGHT: 177.4 LBS | HEART RATE: 62 BPM | BODY MASS INDEX: 27.84 KG/M2 | DIASTOLIC BLOOD PRESSURE: 84 MMHG | OXYGEN SATURATION: 97 % | SYSTOLIC BLOOD PRESSURE: 126 MMHG | HEIGHT: 67 IN

## 2025-04-22 DIAGNOSIS — E89.0 POSTOPERATIVE HYPOTHYROIDISM: Primary | ICD-10-CM

## 2025-04-22 DIAGNOSIS — C73 PAPILLARY THYROID CARCINOMA: ICD-10-CM

## 2025-04-22 LAB
T4 FREE SERPL-MCNC: 1.55 NG/DL (ref 0.92–1.68)
TSH SERPL DL<=0.05 MIU/L-ACNC: 2.06 UIU/ML (ref 0.27–4.2)

## 2025-04-22 PROCEDURE — 84439 ASSAY OF FREE THYROXINE: CPT | Performed by: INTERNAL MEDICINE

## 2025-04-22 PROCEDURE — 99213 OFFICE O/P EST LOW 20 MIN: CPT | Performed by: INTERNAL MEDICINE

## 2025-04-22 PROCEDURE — 84443 ASSAY THYROID STIM HORMONE: CPT | Performed by: INTERNAL MEDICINE

## 2025-04-22 RX ORDER — LEVOTHYROXINE SODIUM 88 UG/1
88 TABLET ORAL DAILY
Qty: 90 TABLET | Refills: 3 | Status: SHIPPED | OUTPATIENT
Start: 2025-04-22

## 2025-04-22 NOTE — PROGRESS NOTES
"     Office Note      Date: 2025  Patient Name: Dangelo Jha  MRN: [id number]  : 1965    Chief Complaint   Patient presents with    Hypothyroidism       History of Present Illness:   Dangelo Jha is a 59 y.o. male who presents for Hypothyroidism    He remains on T4 88mcg qd. He is taking this correctly. He isn't taking any interfering meds concurrently. He hasn't noted any change in the size of his neck. He denies any compressive sxs. He denies any sxs of hypo- or hyperthyroidism at this time.     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 residual thyroid tissue in right thyroid bed - TG 1.0; neck u/s  stable with unstimulted TG of 1.1; neck u/s 2018 was stable and unstimulated TG was 1.1; neck u/s 2019 with stable neck mass and TG 3.0; unstimulated TG 1.1 2020; unstimulated TG 1.5 and neck u/s showed 1cm residual thyroid tissue in right thyroid bed 2021, unstimulated TG 1.7 in 2022; unstimulated TG of 2.3 with stable neck u/s 2023; unstimulated TG 3 in 10/2024.    Subjective      Review of Systems:   Review of Systems   Constitutional: Negative.    Cardiovascular: Negative.    Gastrointestinal: Negative.    Endocrine: Negative.        The following portions of the patient's history were reviewed and updated as appropriate: allergies, current medications, past family history, past medical history, past social history, past surgical history, and problem list.    Objective     Visit Vitals  /84 (BP Location: Left arm, Patient Position: Sitting)   Pulse 62   Ht 170.2 cm (67.01\")   Wt 80.5 kg (177 lb 6.4 oz)   SpO2 97%   BMI 27.78 kg/m²       Physical Exam:  Physical Exam  Constitutional:       Appearance: Normal appearance.   Neck:      Comments: No palpable thyroid tissue or neck masses  Lymphadenopathy:      Cervical: No cervical adenopathy.   Neurological:      Mental " "Status: He is alert.         Labs:    TSH  No results found for: \"TSHBASE\"     Free T4  Free T4   Date Value Ref Range Status   10/09/2024 1.54 0.92 - 1.68 ng/dL Final       T3  No results found for: \"Q3LNBMS\"      TPO  No results found for: \"THYROIDAB\"    TG AB  Thyroglobulin Ab   Date Value Ref Range Status   10/09/2024 <1.0 0.0 - 0.9 IU/mL Final     Comment:     Thyroglobulin Antibody measured by [facility]  It should be noted that the presence of thyroglobulin antibodies  may not be pathogenic nor diagnostic, especially at very low  levels. The assay  has found that four percent of  individuals without evidence of thyroid disease or autoimmunity  will have positive TgAb levels up to 4 IU/mL.   09/21/2022 <1.0 <4.0 IU/mL Final       TG  Thyroglobulin   Date Value Ref Range Status   09/21/2022 1.7 <=34.0 ng/mL Final       CBC w/DIFF  No results found for: \"WBC\", \"RBC\", \"HGB\", \"HCT\", \"MCV\", \"MCH\", \"MCHC\", \"RDW\", \"RDWSD\", \"MPV\", \"PLT\", \"NEUTRORELPCT\", \"LYMPHORELPCT\", \"MONORELPCT\", \"EOSRELPCT\", \"BASORELPCT\", \"AUTOIGPER\", \"NEUTROABS\", \"LYMPHSABS\", \"MONOSABS\", \"EOSABS\", \"BASOSABS\", \"AUTOIGNUM\", \"NRBC\"        Assessment / Plan      Assessment & Plan:  Diagnoses and all orders for this visit:    1. Postoperative hypothyroidism (Primary)  Assessment & Plan:  Continue levothyroxine.  Check TFTs today.    Orders:  -     TSH; Future  -     T4, Free; Future    2. Papillary thyroid carcinoma  Assessment & Plan:  Plan for TG and neck u/s in about 6 months.      Other orders  -     levothyroxine (SYNTHROID, LEVOTHROID) 88 MCG tablet; Take 1 tablet by mouth Daily.  Dispense: 90 tablet; Refill: 3      Current Outpatient Medications   Medication Instructions    levothyroxine (SYNTHROID, LEVOTHROID) 88 mcg, Oral, Daily    Multiple Vitamins-Minerals (Multivitamin Adult) chewable tablet       Return in about 6 months (around 10/22/2025) for Recheck with TSH, free T4, TG, TG ab, neck u/s.    Electronically " signed by: Bob Flynn MD  04/22/2025

## 2025-04-23 ENCOUNTER — RESULTS FOLLOW-UP (OUTPATIENT)
Dept: ENDOCRINOLOGY | Facility: CLINIC | Age: 60
End: 2025-04-23
Payer: COMMERCIAL

## 2025-05-12 RX ORDER — LEVOTHYROXINE SODIUM 88 UG/1
88 TABLET ORAL DAILY
Qty: 90 TABLET | Refills: 3 | Status: SHIPPED | OUTPATIENT
Start: 2025-05-12

## 2025-05-12 NOTE — TELEPHONE ENCOUNTER
Rx Refill Note  Requested Prescriptions     Pending Prescriptions Disp Refills    levothyroxine (SYNTHROID, LEVOTHROID) 88 MCG tablet [Pharmacy Med Name: LEVOTHYROXINE 0.088MG (88MCG) TAB] 90 tablet 3     Sig: TAKE 1 TABLET BY MOUTH DAILY      Last office visit with prescribing clinician: 4/22/2025     Next office visit with prescribing clinician: 11/26/2025     Helene Linares MA  05/12/25, 08:41 EDT